# Patient Record
Sex: MALE | Race: WHITE | Employment: OTHER | URBAN - METROPOLITAN AREA
[De-identification: names, ages, dates, MRNs, and addresses within clinical notes are randomized per-mention and may not be internally consistent; named-entity substitution may affect disease eponyms.]

---

## 2019-08-13 ENCOUNTER — HOSPITAL ENCOUNTER (EMERGENCY)
Age: 69
Discharge: HOME | End: 2019-08-13
Payer: OTHER MISCELLANEOUS

## 2019-08-13 ENCOUNTER — APPOINTMENT (OUTPATIENT)
Dept: CT IMAGING | Age: 69
End: 2019-08-13
Payer: OTHER MISCELLANEOUS

## 2019-08-13 VITALS
HEART RATE: 59 BPM | OXYGEN SATURATION: 99 % | SYSTOLIC BLOOD PRESSURE: 133 MMHG | WEIGHT: 169.75 LBS | TEMPERATURE: 97.6 F | DIASTOLIC BLOOD PRESSURE: 76 MMHG | RESPIRATION RATE: 16 BRPM
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
## 2019-08-13 DIAGNOSIS — S16.1XXA ACUTE STRAIN OF NECK MUSCLE, INITIAL ENCOUNTER: ICD-10-CM

## 2019-08-13 PROCEDURE — 72125 CT NECK SPINE W/O DYE: CPT

## 2019-08-13 PROCEDURE — 99284 EMERGENCY DEPT VISIT MOD MDM: CPT

## 2019-08-13 RX ORDER — IBUPROFEN 800 MG/1
800 TABLET ORAL EVERY 8 HOURS PRN
Qty: 30 TABLET | Refills: 0 | Status: SHIPPED | OUTPATIENT
Start: 2019-08-13

## 2019-08-13 RX ORDER — CYCLOBENZAPRINE HCL 10 MG
10 TABLET ORAL 3 TIMES DAILY PRN
Qty: 21 TABLET | Refills: 0 | Status: SHIPPED | OUTPATIENT
Start: 2019-08-13 | End: 2019-08-20

## 2019-08-13 SDOH — HEALTH STABILITY: MENTAL HEALTH: HOW OFTEN DO YOU HAVE A DRINK CONTAINING ALCOHOL?: NEVER

## 2019-08-13 ASSESSMENT — PAIN SCALES - GENERAL: PAINLEVEL_OUTOF10: 5

## 2019-08-13 ASSESSMENT — PAIN DESCRIPTION - DESCRIPTORS: DESCRIPTORS: ACHING

## 2019-08-13 ASSESSMENT — PAIN DESCRIPTION - LOCATION: LOCATION: FINGER (COMMENT WHICH ONE)

## 2019-08-13 ASSESSMENT — PAIN DESCRIPTION - PAIN TYPE: TYPE: ACUTE PAIN

## 2019-08-14 NOTE — ED PROVIDER NOTES
strain: Not on file    Food insecurity:     Worry: Not on file     Inability: Not on file    Transportation needs:     Medical: Not on file     Non-medical: Not on file   Tobacco Use    Smoking status: Never Smoker    Smokeless tobacco: Never Used   Substance and Sexual Activity    Alcohol use: Never     Frequency: Never    Drug use: Never    Sexual activity: Not on file   Lifestyle    Physical activity:     Days per week: Not on file     Minutes per session: Not on file    Stress: Not on file   Relationships    Social connections:     Talks on phone: Not on file     Gets together: Not on file     Attends Zoroastrian service: Not on file     Active member of club or organization: Not on file     Attends meetings of clubs or organizations: Not on file     Relationship status: Not on file    Intimate partner violence:     Fear of current or ex partner: Not on file     Emotionally abused: Not on file     Physically abused: Not on file     Forced sexual activity: Not on file   Other Topics Concern    Not on file   Social History Narrative    Not on file     No family history on file.     PHYSICAL EXAM    VITAL SIGNS: /76   Pulse 59   Temp 97.6 °F (36.4 °C) (Temporal)   Resp 16   Wt 169 lb 12.1 oz (77 kg)   SpO2 99%    Constitutional:  Well developed, well nourished, no acute distress   HENT:  Atraumatic, moist mucus membranes  Neck: supple, no JVD, + mild midline cervical paraspinous tenderness to palpation  Respiratory: Lungs clear to auscultation bilaterally, no retractions   Cardiovascular:  Regular rate, no murmurs  GI:  Soft, nontender, no pulsatile masses   Musculoskeletal:  no edema, no acute deformities  Back: no thoracic paraspinous tenderness to palpation, no lumbar paraspinous tenderness to palpation, no bony midline tenderness  Integument:  Well hydrated, no petechiae   Neurologic: Alert & oriented, normal speech, motor 5/5 in upper extremities bilaterally, wrist/finger extension and

## 2019-08-28 ENCOUNTER — HOSPITAL ENCOUNTER (OUTPATIENT)
Dept: PHYSICAL THERAPY | Age: 69
Setting detail: THERAPIES SERIES
Discharge: HOME | End: 2019-08-28
Payer: MEDICARE

## 2019-08-28 PROCEDURE — 97161 PT EVAL LOW COMPLEX 20 MIN: CPT

## 2019-08-28 PROCEDURE — 97140 MANUAL THERAPY 1/> REGIONS: CPT

## 2019-08-28 PROCEDURE — 97530 THERAPEUTIC ACTIVITIES: CPT

## 2019-08-28 PROCEDURE — 97110 THERAPEUTIC EXERCISES: CPT

## 2019-08-29 NOTE — PROGRESS NOTES
Tolerance  Activity Tolerance: Patient Tolerated treatment well;Patient limited by pain         Plan   Plan  Times per week: 2  Times per day: Daily  Plan weeks: 6  Current Treatment Recommendations: Strengthening, ROM, Manual Therapy - Joint Manipulation, Pain Management, Manual Therapy - Soft Tissue Mobilization, Home Exercise Program, Modalities    G-Code       OutComes Score  Neck Disability Index Raw Score: 16 (08/29/19 3646)                                           Goals  Short term goals  Time Frame for Short term goals: 2 Weeks  Short term goal 1: Pt will show independence in HEP  Long term goals  Time Frame for Long term goals : 4 Weeks  Long term goal 1: Pt will show full cervical AROM that is pain free in all directions so he can sleep through the night  Long term goal 2: Pt will report <2/10 pain so he can return to all ADLs  Patient Goals   Patient goals : \"no pain\"       Therapy Time   Individual Concurrent Group Co-treatment   Time In           Time Out           Minutes                   Larissa Navarro PT

## 2019-09-03 ENCOUNTER — APPOINTMENT (OUTPATIENT)
Dept: PHYSICAL THERAPY | Age: 69
End: 2019-09-03
Payer: OTHER MISCELLANEOUS

## 2019-09-04 ENCOUNTER — APPOINTMENT (OUTPATIENT)
Dept: PHYSICAL THERAPY | Age: 69
End: 2019-09-04
Payer: OTHER MISCELLANEOUS

## 2019-09-09 ENCOUNTER — HOSPITAL ENCOUNTER (OUTPATIENT)
Dept: PHYSICAL THERAPY | Age: 69
Setting detail: THERAPIES SERIES
Discharge: HOME | End: 2019-09-09
Payer: OTHER MISCELLANEOUS

## 2019-09-09 PROCEDURE — 97140 MANUAL THERAPY 1/> REGIONS: CPT
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
## 2019-09-11 ENCOUNTER — HOSPITAL ENCOUNTER (OUTPATIENT)
Dept: PHYSICAL THERAPY | Age: 69
Setting detail: THERAPIES SERIES
Discharge: HOME | End: 2019-09-11
Payer: OTHER MISCELLANEOUS

## 2019-09-11 PROCEDURE — 97110 THERAPEUTIC EXERCISES: CPT

## 2019-09-11 PROCEDURE — 97140 MANUAL THERAPY 1/> REGIONS: CPT

## 2019-09-16 ENCOUNTER — HOSPITAL ENCOUNTER (OUTPATIENT)
Dept: PHYSICAL THERAPY | Age: 69
Setting detail: THERAPIES SERIES
Discharge: HOME | End: 2019-09-16
Payer: OTHER MISCELLANEOUS

## 2019-09-16 PROCEDURE — 97110 THERAPEUTIC EXERCISES: CPT

## 2019-09-16 PROCEDURE — 97140 MANUAL THERAPY 1/> REGIONS: CPT
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
## 2019-09-18 ENCOUNTER — HOSPITAL ENCOUNTER (OUTPATIENT)
Dept: PHYSICAL THERAPY | Age: 69
Setting detail: THERAPIES SERIES
Discharge: HOME | End: 2019-09-18
Payer: OTHER MISCELLANEOUS

## 2019-09-18 PROCEDURE — 97140 MANUAL THERAPY 1/> REGIONS: CPT

## 2019-09-23 ENCOUNTER — HOSPITAL ENCOUNTER (OUTPATIENT)
Dept: PHYSICAL THERAPY | Age: 69
Setting detail: THERAPIES SERIES
Discharge: HOME | End: 2019-09-23
Payer: OTHER MISCELLANEOUS

## 2019-09-23 PROCEDURE — 97110 THERAPEUTIC EXERCISES: CPT

## 2019-09-23 PROCEDURE — 97140 MANUAL THERAPY 1/> REGIONS: CPT

## 2019-09-30 ENCOUNTER — HOSPITAL ENCOUNTER (OUTPATIENT)
Dept: PHYSICAL THERAPY | Age: 69
Setting detail: THERAPIES SERIES
Discharge: HOME | End: 2019-09-30
Payer: OTHER MISCELLANEOUS

## 2019-09-30 PROCEDURE — 97110 THERAPEUTIC EXERCISES: CPT

## 2019-09-30 PROCEDURE — 97140 MANUAL THERAPY 1/> REGIONS: CPT

## 2019-10-02 ENCOUNTER — HOSPITAL ENCOUNTER (OUTPATIENT)
Dept: PHYSICAL THERAPY | Age: 69
Setting detail: THERAPIES SERIES
Discharge: HOME | End: 2019-10-02
Payer: OTHER MISCELLANEOUS

## 2019-10-02 PROCEDURE — 97140 MANUAL THERAPY 1/> REGIONS: CPT

## 2019-10-02 PROCEDURE — 97110 THERAPEUTIC EXERCISES: CPT

## 2019-10-09 ENCOUNTER — HOSPITAL ENCOUNTER (OUTPATIENT)
Dept: PHYSICAL THERAPY | Age: 69
Setting detail: THERAPIES SERIES
Discharge: HOME | End: 2019-10-09
Payer: OTHER MISCELLANEOUS

## 2019-10-09 PROCEDURE — 97140 MANUAL THERAPY 1/> REGIONS: CPT

## 2019-10-09 PROCEDURE — 97110 THERAPEUTIC EXERCISES: CPT

## 2019-10-11 ENCOUNTER — APPOINTMENT (OUTPATIENT)
Dept: PHYSICAL THERAPY | Age: 69
End: 2019-10-11
Payer: OTHER MISCELLANEOUS

## 2019-10-16 ENCOUNTER — HOSPITAL ENCOUNTER (OUTPATIENT)
Dept: PHYSICAL THERAPY | Age: 69
Setting detail: THERAPIES SERIES
Discharge: HOME | End: 2019-10-16
Payer: OTHER MISCELLANEOUS

## 2019-10-16 PROCEDURE — 97110 THERAPEUTIC EXERCISES: CPT

## 2019-10-16 PROCEDURE — 97140 MANUAL THERAPY 1/> REGIONS: CPT

## 2019-10-18 ENCOUNTER — APPOINTMENT (OUTPATIENT)
Dept: PHYSICAL THERAPY | Age: 69
End: 2019-10-18
Payer: OTHER MISCELLANEOUS

## 2019-10-22 ENCOUNTER — HOSPITAL ENCOUNTER (OUTPATIENT)
Dept: PHYSICAL THERAPY | Age: 69
Setting detail: THERAPIES SERIES
Discharge: HOME | End: 2019-10-22
Payer: OTHER MISCELLANEOUS

## 2019-10-22 PROCEDURE — 97110 THERAPEUTIC EXERCISES: CPT

## 2019-10-22 PROCEDURE — 97140 MANUAL THERAPY 1/> REGIONS: CPT

## 2019-10-24 ENCOUNTER — HOSPITAL ENCOUNTER (OUTPATIENT)
Dept: PHYSICAL THERAPY | Age: 69
Setting detail: THERAPIES SERIES
Discharge: HOME | End: 2019-10-24
Payer: OTHER MISCELLANEOUS

## 2019-10-24 PROCEDURE — 97110 THERAPEUTIC EXERCISES: CPT

## 2019-10-24 PROCEDURE — 97140 MANUAL THERAPY 1/> REGIONS: CPT

## 2019-10-28 ENCOUNTER — HOSPITAL ENCOUNTER (OUTPATIENT)
Dept: PHYSICAL THERAPY | Age: 69
Setting detail: THERAPIES SERIES
Discharge: HOME | End: 2019-10-28
Payer: OTHER MISCELLANEOUS

## 2019-10-28 PROCEDURE — 97110 THERAPEUTIC EXERCISES: CPT

## 2019-10-28 PROCEDURE — 97140 MANUAL THERAPY 1/> REGIONS: CPT

## 2019-10-30 ENCOUNTER — HOSPITAL ENCOUNTER (OUTPATIENT)
Dept: PHYSICAL THERAPY | Age: 69
Setting detail: THERAPIES SERIES
Discharge: HOME | End: 2019-10-30
Payer: OTHER MISCELLANEOUS

## 2019-10-30 PROCEDURE — 97140 MANUAL THERAPY 1/> REGIONS: CPT

## 2019-10-30 PROCEDURE — 97110 THERAPEUTIC EXERCISES: CPT

## 2019-11-04 ENCOUNTER — HOSPITAL ENCOUNTER (OUTPATIENT)
Dept: PHYSICAL THERAPY | Age: 69
Setting detail: THERAPIES SERIES
Discharge: HOME | End: 2019-11-04
Payer: COMMERCIAL

## 2019-11-04 PROCEDURE — 97110 THERAPEUTIC EXERCISES: CPT

## 2019-11-04 PROCEDURE — 97140 MANUAL THERAPY 1/> REGIONS: CPT

## 2019-11-06 ENCOUNTER — HOSPITAL ENCOUNTER (OUTPATIENT)
Dept: PHYSICAL THERAPY | Age: 69
Setting detail: THERAPIES SERIES
Discharge: HOME | End: 2019-11-06
Payer: COMMERCIAL

## 2019-11-06 PROCEDURE — 97140 MANUAL THERAPY 1/> REGIONS: CPT

## 2019-11-06 PROCEDURE — 97012 MECHANICAL TRACTION THERAPY: CPT

## 2019-11-06 PROCEDURE — 97110 THERAPEUTIC EXERCISES: CPT

## 2019-11-11 ENCOUNTER — HOSPITAL ENCOUNTER (OUTPATIENT)
Dept: PHYSICAL THERAPY | Age: 69
Setting detail: THERAPIES SERIES
Discharge: HOME | End: 2019-11-11
Payer: COMMERCIAL

## 2019-11-11 PROCEDURE — 97140 MANUAL THERAPY 1/> REGIONS: CPT

## 2019-11-13 ENCOUNTER — HOSPITAL ENCOUNTER (OUTPATIENT)
Dept: PHYSICAL THERAPY | Age: 69
Setting detail: THERAPIES SERIES
Discharge: HOME | End: 2019-11-13
Payer: COMMERCIAL

## 2019-11-13 PROCEDURE — 97012 MECHANICAL TRACTION THERAPY: CPT

## 2019-11-13 PROCEDURE — 97140 MANUAL THERAPY 1/> REGIONS: CPT

## 2019-11-18 ENCOUNTER — HOSPITAL ENCOUNTER (OUTPATIENT)
Dept: PHYSICAL THERAPY | Age: 69
Setting detail: THERAPIES SERIES
Discharge: HOME | End: 2019-11-18
Payer: COMMERCIAL

## 2019-11-18 PROCEDURE — 97012 MECHANICAL TRACTION THERAPY: CPT

## 2019-11-18 PROCEDURE — 97140 MANUAL THERAPY 1/> REGIONS: CPT

## 2019-11-18 PROCEDURE — 97110 THERAPEUTIC EXERCISES: CPT

## 2019-11-24 ENCOUNTER — APPOINTMENT (OUTPATIENT)
Dept: GENERAL RADIOLOGY | Age: 69
End: 2019-11-24
Payer: COMMERCIAL

## 2019-11-24 ENCOUNTER — HOSPITAL ENCOUNTER (EMERGENCY)
Age: 69
Discharge: HOME | End: 2019-11-24
Payer: COMMERCIAL

## 2019-11-24 VITALS
DIASTOLIC BLOOD PRESSURE: 95 MMHG | OXYGEN SATURATION: 97 % | WEIGHT: 176.37 LBS | TEMPERATURE: 98.3 F | SYSTOLIC BLOOD PRESSURE: 153 MMHG | HEIGHT: 68 IN | RESPIRATION RATE: 17 BRPM | HEART RATE: 58 BPM | BODY MASS INDEX: 26.73 KG/M2

## 2019-11-24 DIAGNOSIS — R68.84 MANDIBLE PAIN: ICD-10-CM
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
## 2019-11-24 DIAGNOSIS — S16.1XXA STRAIN OF NECK MUSCLE, INITIAL ENCOUNTER: ICD-10-CM

## 2019-11-24 PROCEDURE — 70100 X-RAY EXAM OF JAW <4VIEWS: CPT

## 2019-11-24 PROCEDURE — 99284 EMERGENCY DEPT VISIT MOD MDM: CPT

## 2019-11-24 ASSESSMENT — PAIN DESCRIPTION - ONSET: ONSET: ON-GOING

## 2019-11-24 ASSESSMENT — ENCOUNTER SYMPTOMS
CHEST TIGHTNESS: 0
NAUSEA: 0
VOMITING: 0
SHORTNESS OF BREATH: 0
COLOR CHANGE: 0

## 2019-11-24 ASSESSMENT — PAIN DESCRIPTION - PROGRESSION: CLINICAL_PROGRESSION: NOT CHANGED

## 2019-11-24 ASSESSMENT — PAIN DESCRIPTION - DESCRIPTORS: DESCRIPTORS: DISCOMFORT

## 2019-11-24 ASSESSMENT — PAIN DESCRIPTION - FREQUENCY: FREQUENCY: CONTINUOUS

## 2019-11-24 ASSESSMENT — PAIN DESCRIPTION - ORIENTATION: ORIENTATION: LEFT

## 2019-11-24 ASSESSMENT — PAIN DESCRIPTION - LOCATION: LOCATION: NECK;BACK

## 2019-11-24 ASSESSMENT — PAIN DESCRIPTION - PAIN TYPE: TYPE: ACUTE PAIN

## 2019-11-24 ASSESSMENT — PAIN - FUNCTIONAL ASSESSMENT: PAIN_FUNCTIONAL_ASSESSMENT: ACTIVITIES ARE NOT PREVENTED

## 2019-11-24 ASSESSMENT — PAIN SCALES - GENERAL: PAINLEVEL_OUTOF10: 5

## 2019-12-16 ENCOUNTER — HOSPITAL ENCOUNTER (OUTPATIENT)
Dept: PHYSICAL THERAPY | Age: 69
Setting detail: THERAPIES SERIES
Discharge: HOME | End: 2019-12-16
Payer: COMMERCIAL

## 2019-12-16 PROCEDURE — 97140 MANUAL THERAPY 1/> REGIONS: CPT

## 2019-12-16 PROCEDURE — 97530 THERAPEUTIC ACTIVITIES: CPT

## 2019-12-16 PROCEDURE — 97110 THERAPEUTIC EXERCISES: CPT

## 2019-12-16 PROCEDURE — 97162 PT EVAL MOD COMPLEX 30 MIN: CPT

## 2019-12-20 ENCOUNTER — HOSPITAL ENCOUNTER (OUTPATIENT)
Dept: PHYSICAL THERAPY | Age: 69
Setting detail: THERAPIES SERIES
Discharge: HOME | End: 2019-12-20
Payer: COMMERCIAL

## 2019-12-20 PROCEDURE — 97140 MANUAL THERAPY 1/> REGIONS: CPT

## 2019-12-23 ENCOUNTER — HOSPITAL ENCOUNTER (OUTPATIENT)
Dept: PHYSICAL THERAPY | Age: 69
Setting detail: THERAPIES SERIES
Discharge: HOME | End: 2019-12-23
Payer: COMMERCIAL

## 2019-12-23 PROCEDURE — 97110 THERAPEUTIC EXERCISES: CPT

## 2019-12-23 PROCEDURE — 97140 MANUAL THERAPY 1/> REGIONS: CPT

## 2019-12-26 ENCOUNTER — HOSPITAL ENCOUNTER (OUTPATIENT)
Dept: PHYSICAL THERAPY | Age: 69
Setting detail: THERAPIES SERIES
Discharge: HOME | End: 2019-12-26
Payer: COMMERCIAL

## 2019-12-26 PROCEDURE — 97110 THERAPEUTIC EXERCISES: CPT

## 2019-12-26 PROCEDURE — 97140 MANUAL THERAPY 1/> REGIONS: CPT

## 2019-12-30 ENCOUNTER — HOSPITAL ENCOUNTER (OUTPATIENT)
Dept: PHYSICAL THERAPY | Age: 69
Setting detail: THERAPIES SERIES
Discharge: HOME | End: 2019-12-30
Payer: COMMERCIAL

## 2019-12-30 PROCEDURE — 97140 MANUAL THERAPY 1/> REGIONS: CPT

## 2019-12-30 PROCEDURE — 97110 THERAPEUTIC EXERCISES: CPT

## 2020-01-02 ENCOUNTER — HOSPITAL ENCOUNTER (OUTPATIENT)
Dept: PHYSICAL THERAPY | Age: 70
Setting detail: THERAPIES SERIES
Discharge: HOME | End: 2020-01-02
Payer: COMMERCIAL

## 2020-01-02 PROCEDURE — 97140 MANUAL THERAPY 1/> REGIONS: CPT

## 2020-01-02 PROCEDURE — 97012 MECHANICAL TRACTION THERAPY: CPT
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
## 2020-01-06 ENCOUNTER — HOSPITAL ENCOUNTER (OUTPATIENT)
Dept: PHYSICAL THERAPY | Age: 70
Setting detail: THERAPIES SERIES
Discharge: HOME | End: 2020-01-06
Payer: COMMERCIAL

## 2020-01-06 PROCEDURE — 97012 MECHANICAL TRACTION THERAPY: CPT

## 2020-01-06 PROCEDURE — 97140 MANUAL THERAPY 1/> REGIONS: CPT

## 2020-01-06 PROCEDURE — 97110 THERAPEUTIC EXERCISES: CPT
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
## 2020-01-08 ENCOUNTER — HOSPITAL ENCOUNTER (OUTPATIENT)
Dept: PHYSICAL THERAPY | Age: 70
Setting detail: THERAPIES SERIES
Discharge: HOME | End: 2020-01-08
Payer: COMMERCIAL

## 2020-01-08 PROCEDURE — 97110 THERAPEUTIC EXERCISES: CPT

## 2020-01-08 PROCEDURE — 97140 MANUAL THERAPY 1/> REGIONS: CPT

## 2020-01-08 PROCEDURE — 97012 MECHANICAL TRACTION THERAPY: CPT
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
## 2020-01-29 ENCOUNTER — HOSPITAL ENCOUNTER (OUTPATIENT)
Dept: PHYSICAL THERAPY | Age: 70
Setting detail: THERAPIES SERIES
Discharge: HOME | End: 2020-01-29
Payer: COMMERCIAL

## 2020-01-29 PROCEDURE — 97140 MANUAL THERAPY 1/> REGIONS: CPT

## 2020-01-29 PROCEDURE — 97012 MECHANICAL TRACTION THERAPY: CPT
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
## 2020-02-07 ENCOUNTER — HOSPITAL ENCOUNTER (OUTPATIENT)
Dept: PHYSICAL THERAPY | Age: 70
Setting detail: THERAPIES SERIES
Discharge: HOME | End: 2020-02-07
Payer: COMMERCIAL

## 2020-02-07 PROCEDURE — 97110 THERAPEUTIC EXERCISES: CPT

## 2020-02-07 PROCEDURE — 97140 MANUAL THERAPY 1/> REGIONS: CPT

## 2020-02-07 PROCEDURE — 97012 MECHANICAL TRACTION THERAPY: CPT
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
## 2020-02-10 ENCOUNTER — HOSPITAL ENCOUNTER (OUTPATIENT)
Dept: PHYSICAL THERAPY | Age: 70
Setting detail: THERAPIES SERIES
Discharge: HOME | End: 2020-02-10
Payer: COMMERCIAL

## 2020-02-10 NOTE — PROGRESS NOTES
Physical Therapy  Cancellation/No-show Note  Patient Name:  Mahnaz Galeas  :  1950   Date:  2/10/2020  Cancelled visits to date: 1  No-shows to date: 0    For today's appointment patient:  [x]  Cancelled  []  Rescheduled appointment  []  No-show     Reason given by patient:  [x]  Patient ill  []  Conflicting appointment  []  No transportation    []  Conflict with work  []  No reason given  []  Other:     Comments:  Has flu    Electronically signed by:  Laurie Duane, PT

## 2020-02-12 ENCOUNTER — HOSPITAL ENCOUNTER (OUTPATIENT)
Dept: PHYSICAL THERAPY | Age: 70
Setting detail: THERAPIES SERIES
Discharge: HOME | End: 2020-02-12
Payer: COMMERCIAL

## 2020-02-12 PROCEDURE — 97110 THERAPEUTIC EXERCISES: CPT

## 2020-02-12 PROCEDURE — 97012 MECHANICAL TRACTION THERAPY: CPT

## 2020-02-12 PROCEDURE — 97140 MANUAL THERAPY 1/> REGIONS: CPT

## 2020-02-12 NOTE — FLOWSHEET NOTE
across low back when getting up from supine position. 1/6/19: Pt states he is still sore in the back and neck. 1/29/20: States he has been having low back pain up to a 5/10 when standing for a period of time while working. Also gets a little pain in his left hamstring after sitting for 15-20 min. Still having some pain in his neck on the left side, mostly at night. 2/7/20: Pt states back is still hurting some in the middle low back, starts after about 1.5 hr.  02/12/20: Patient reports some stiffness on left side of the neck. 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 Objective:   Observation:    Test measurements:      Exercises:  Exercise/Equipment Resistance/Repetitions Other comments   HSS    Chin tuck    PPT  10 x 10\"   Tband High Row  2 x 20 Blue  LPD  2 x 20 Dark Blue    Piriformis Stretch 3 x 30\" B         HEP     SKTC, Piriformis, and HS Stretch  12/16   PPT  12/16    UT stretch  12/16   Cervical AROM  12/16   Supine cerv rotation  12/20   shrugs  12/20   Supine chin tuck  12/20                    Other Therapeutic Activities:      Home Exercise Program:        Manual Treatments:   Cervical:  STM to cervical paraspinals on left and left UT  UT stretch, cervical sideglides, and Light traction to cervical    Lumbar:   STM to lumbar paraspinals, PA mobilizations to lumbar spine - prone  B hip distraction mobilization    Modalities:    Cervical Traction: 19#/10# x 13 min        Progression Towards Functional goals:  [x] Patient is progressing as expected towards functional goals listed. [] Progression is slowed due to complexities listed. [] Progression has been slowed due to co-morbidities. [] Plan just implemented, too soon to assess goals progression  [] Other:    Charges:     Therapeutic Exercise:  [x] (22431) Provided verbal/tactile cueing for activities to restore or maintain strength, flexibility, endurance, ROM for improvements with self-care, mobility, lifting and ambulation. Neuromuscular Re-Education  [] (06978) Provided verbal/tactile cueing for activities to restore or maintain balance, coordination, kinesthetic sense, posture, motor skill, proprioception for self-care, mobility, lifting, and ambulation. Therapeutic Activities:    [x] (55474) Provided verbal/tactile cueing to address functional limitations related to loss of mobility, strength, balance, and coordination. Gait Training:  [] (36190) Provided training and instruction to the patient for proper postural muscle recruitment and positioning with ambulation re-education     Home Exercise Program:    [x] (70698) Reviewed/Progressed HEP activities related to strengthening, flexibility, endurance, ROM for functional self-care, mobility, lifting and ambulation   [] (32123) Reviewed/Progressed HEP activities related to improving balance, coordination, kinesthetic sense, posture, motor skill, proprioception for self-care, mobility, lifting, and ambulation      Manual Treatments:  MFR / STM / Oscillations-Mobs:  G-I, II, III, IV / Manipulation / MLD  [x] (56678) Provided manual therapy to mobilize  soft tissue/joints/fluid for the purpose of modulating pain, promoting relaxation, increasing ROM, reducing/eliminating soft tissue swelling/inflammation/restriction, improving soft tissue extensibility and allowing for proper ROM for normal function with self- care, mobility, lifting and ambulation.         Timed Code Treatment Minutes: 50   Total Treatment Minutes: 65     [] EVAL (LOW) 99153   [] EVAL (MOD) 97536   [] EVAL (HIGH) 20846   [] RE-EVAL   [x] TE (37833) x 1    [] Aquatic (20300) x  [] NMR (38984)   x  [] Aquatic Group (02764) x  [x] Manual (14374) x 2  [] Ultrasound (28658) x  [] TA (27228) x  [x] Mech Traction (86576)  [] Ionto (19050)           [] ES (un) (86812):   [] Vasopump (03406) [] Other:      Assessment  [x] Patient tolerated treatment well [] Patient limited by

## 2020-02-14 ENCOUNTER — HOSPITAL ENCOUNTER (OUTPATIENT)
Dept: PHYSICAL THERAPY | Age: 70
Setting detail: THERAPIES SERIES
Discharge: HOME | End: 2020-02-14
Payer: COMMERCIAL

## 2020-02-14 PROCEDURE — 97530 THERAPEUTIC ACTIVITIES: CPT

## 2020-02-14 PROCEDURE — 97140 MANUAL THERAPY 1/> REGIONS: CPT

## 2020-02-14 PROCEDURE — 97012 MECHANICAL TRACTION THERAPY: CPT

## 2020-02-17 ENCOUNTER — HOSPITAL ENCOUNTER (OUTPATIENT)
Dept: PHYSICAL THERAPY | Age: 70
Setting detail: THERAPIES SERIES
Discharge: HOME | End: 2020-02-17
Payer: COMMERCIAL

## 2020-02-17 PROCEDURE — 97012 MECHANICAL TRACTION THERAPY: CPT

## 2020-02-17 PROCEDURE — 97140 MANUAL THERAPY 1/> REGIONS: CPT

## 2020-02-17 NOTE — FLOWSHEET NOTE
Gait Training:  [] (00756) Provided training and instruction to the patient for proper postural muscle recruitment and positioning with ambulation re-education     Home Exercise Program:    [x] (81335) Reviewed/Progressed HEP activities related to strengthening, flexibility, endurance, ROM for functional self-care, mobility, lifting and ambulation   [] (98853) Reviewed/Progressed HEP activities related to improving balance, coordination, kinesthetic sense, posture, motor skill, proprioception for self-care, mobility, lifting, and ambulation      Manual Treatments:  MFR / STM / Oscillations-Mobs:  G-I, II, III, IV / Manipulation / MLD  [x] (15640) Provided manual therapy to mobilize  soft tissue/joints/fluid for the purpose of modulating pain, promoting relaxation, increasing ROM, reducing/eliminating soft tissue swelling/inflammation/restriction, improving soft tissue extensibility and allowing for proper ROM for normal function with self- care, mobility, lifting and ambulation.         Timed Code Treatment Minutes: 30   Total Treatment Minutes: 53     [] EVAL (LOW) 90849   [] EVAL (MOD) 16009   [] EVAL (HIGH) 13268   [] RE-EVAL   [] TE (18843) x     [] Aquatic (17631) x  [] NMR (75799)   x  [] Aquatic Group (41786) x  [x] Manual (00809) x 2   [] Ultrasound (09777) x  [] TA (67246) x  [x] Mech Traction (46709)  [] Ionto (00292)           [] ES (un) (60918):   [] Vasopump (03208) [] Other:      Assessment  [x] Patient tolerated treatment well [] Patient limited by fatigue  [x] Patient limited by pain  [] Patient limited by other medical complications  [] Other:     Prognosis: [x] Good [] Fair  [] Poor    Goals:    Short term goals  Time Frame for Short term goals: 3 Weeks  Short term goal 1: Pt will show independence in HEP for lumbar and cervical  Short term goal 2: Pt will show full lumbar AROM  Short term goal 3: Pt will report no radicular symptoms in left upper arm      Long term goals  Time Frame for Long term goals : 6 Weeks  Long term goal 1: Pt will show full cervical and lumbar AROM without pain so he can sleep through the night and return to all ADLs. Long term goal 2: Pt will report <2/10 pain consistently so he can lift light weight  Long term goal 3: Pt will show normal left hamstring muscle length     Patient Requires Follow-up: [x] Yes  [] No    Plan:   [x] Continue per plan of care [] Alter current plan (see comments)  [] Plan of care initiated [] Hold pending MD visit [] Discharge    Plan for Next Session:    Manual above to cervical, thoracic, and lumbar to restore mobility.   Progress to mechanical cervical traction  Core and scapular strengthening, posture education  Modalities: MHP    Electronically signed by:  Ubaldo Castanon PT

## 2020-02-19 ENCOUNTER — HOSPITAL ENCOUNTER (OUTPATIENT)
Dept: PHYSICAL THERAPY | Age: 70
Setting detail: THERAPIES SERIES
Discharge: HOME | End: 2020-02-19
Payer: COMMERCIAL

## 2020-02-19 PROCEDURE — 97012 MECHANICAL TRACTION THERAPY: CPT

## 2020-02-19 PROCEDURE — 97110 THERAPEUTIC EXERCISES: CPT

## 2020-02-19 PROCEDURE — 97140 MANUAL THERAPY 1/> REGIONS: CPT
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
## 2020-02-24 ENCOUNTER — HOSPITAL ENCOUNTER (OUTPATIENT)
Dept: PHYSICAL THERAPY | Age: 70
Setting detail: THERAPIES SERIES
Discharge: HOME | End: 2020-02-24
Payer: COMMERCIAL

## 2020-02-24 PROCEDURE — 97110 THERAPEUTIC EXERCISES: CPT

## 2020-02-24 PROCEDURE — 97140 MANUAL THERAPY 1/> REGIONS: CPT

## 2020-02-24 PROCEDURE — 97012 MECHANICAL TRACTION THERAPY: CPT

## 2020-02-24 NOTE — FLOWSHEET NOTE
neck is feeling better. Still some pain in left low back after standing for a few hours but is improving  2/24/20: Pt states he is doing better but still having back pain after standing at work for 2 hours and neck pain when turning his head fully to the right. Objective:   Observation:    Test measurements:      Exercises:  Exercise/Equipment Resistance/Repetitions Other comments   HSS 3 x 30\" B   Chin tuck 10 x 10\"   PPT  10 x 10\"   Tband High Row  2 x 20 Blue  LPD  2 x 20 Dark Blue   Lateral cerv. 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 Pt demonstrated and verbalized understanding. Home Exercise Program:        Manual Treatments:   Cervical:  STM to cervical paraspinals on left and left UT  UT stretch, cervical sideglides, and Light traction to cervical  Lumbar:   STM to lumbar paraspinals, PA mobilizations to lumbar spine - prone  B hip distraction mobilization    Modalities:    Cervical Traction: 19#/10# x 13 min        Progression Towards Functional goals:  [x] Patient is progressing as expected towards functional goals listed. [] Progression is slowed due to complexities listed. [] Progression has been slowed due to co-morbidities. [] Plan just implemented, too soon to assess goals progression  [] Other:    Charges: Therapeutic Exercise:  [x] (21035) Provided verbal/tactile cueing for activities to restore or maintain strength, flexibility, endurance, ROM for improvements with self-care, mobility, lifting and ambulation.     Neuromuscular Re-Education  [] (86938) Provided verbal/tactile cueing for Good [] Fair  [] Poor    Goals:    Short term goals  Time Frame for Short term goals: 3 Weeks  Short term goal 1: Pt will show independence in HEP for lumbar and cervical  Short term goal 2: Pt will show full lumbar AROM  Short term goal 3: Pt will report no radicular symptoms in left upper arm      Long term goals  Time Frame for Long term goals : 6 Weeks  Long term goal 1: Pt will show full cervical and lumbar AROM without pain so he can sleep through the night and return to all ADLs. Long term goal 2: Pt will report <2/10 pain consistently so he can lift light weight  Long term goal 3: Pt will show normal left hamstring muscle length     Patient Requires Follow-up: [x] Yes  [] No    Plan:   [x] Continue per plan of care [] Alter current plan (see comments)  [] Plan of care initiated [] Hold pending MD visit [] Discharge    Plan for Next Session:    Manual above to cervical, thoracic, and lumbar to restore mobility.   Progress to mechanical cervical traction  Core and scapular strengthening, posture education  Modalities: MHP    Electronically signed by:  Idris Jason PT

## 2020-02-26 ENCOUNTER — HOSPITAL ENCOUNTER (OUTPATIENT)
Dept: PHYSICAL THERAPY | Age: 70
Setting detail: THERAPIES SERIES
Discharge: HOME | End: 2020-02-26
Payer: COMMERCIAL

## 2020-02-26 PROCEDURE — 97140 MANUAL THERAPY 1/> REGIONS: CPT

## 2020-02-26 PROCEDURE — 97012 MECHANICAL TRACTION THERAPY: CPT
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
## 2020-03-02 ENCOUNTER — HOSPITAL ENCOUNTER (OUTPATIENT)
Dept: PHYSICAL THERAPY | Age: 70
Setting detail: THERAPIES SERIES
Discharge: HOME | End: 2020-03-02
Payer: COMMERCIAL

## 2020-03-02 PROCEDURE — 97140 MANUAL THERAPY 1/> REGIONS: CPT

## 2020-03-02 PROCEDURE — 97012 MECHANICAL TRACTION THERAPY: CPT

## 2020-03-02 NOTE — FLOWSHEET NOTE
flexibility, endurance, ROM for improvements with self-care, mobility, lifting and ambulation. Neuromuscular Re-Education  [] (80276) Provided verbal/tactile cueing for activities to restore or maintain balance, coordination, kinesthetic sense, posture, motor skill, proprioception for self-care, mobility, lifting, and ambulation. Therapeutic Activities:    [x] (69198) Provided verbal/tactile cueing to address functional limitations related to loss of mobility, strength, balance, and coordination. Gait Training:  [] (70194) Provided training and instruction to the patient for proper postural muscle recruitment and positioning with ambulation re-education     Home Exercise Program:    [x] (39303) Reviewed/Progressed HEP activities related to strengthening, flexibility, endurance, ROM for functional self-care, mobility, lifting and ambulation   [] (89028) Reviewed/Progressed HEP activities related to improving balance, coordination, kinesthetic sense, posture, motor skill, proprioception for self-care, mobility, lifting, and ambulation      Manual Treatments:  MFR / STM / Oscillations-Mobs:  G-I, II, III, IV / Manipulation / MLD  [x] (39985) Provided manual therapy to mobilize  soft tissue/joints/fluid for the purpose of modulating pain, promoting relaxation, increasing ROM, reducing/eliminating soft tissue swelling/inflammation/restriction, improving soft tissue extensibility and allowing for proper ROM for normal function with self- care, mobility, lifting and ambulation.         Timed Code Treatment Minutes: 40   Total Treatment Minutes: 62     [] EVAL (LOW) 75137   [] EVAL (MOD) 47563   [] EVAL (HIGH) 60388   [] RE-EVAL   [] TE (78674) x     [] Aquatic (75070) x  [] NMR (88315)   x  [] Aquatic Group (57989) x  [x] Manual (14664) x 3  [] Ultrasound (19998) x  [] TA (01595) x  [x] Mech Traction (48556)  [] Ionto (78272)           [] ES (un) (78757):   [] Vasopump (64728) [] Other:      Assessment  [x] Patient tolerated treatment well [] Patient limited by fatigue  [] Patient limited by pain  [] Patient limited by other medical complications  [] Other:     Prognosis: [x] Good [] Fair  [] Poor    Goals:    Short term goals  Time Frame for Short term goals: 3 Weeks  Short term goal 1: Pt will show independence in HEP for lumbar and cervical  Short term goal 2: Pt will show full lumbar AROM  Short term goal 3: Pt will report no radicular symptoms in left upper arm      Long term goals  Time Frame for Long term goals : 6 Weeks  Long term goal 1: Pt will show full cervical and lumbar AROM without pain so he can sleep through the night and return to all ADLs. Long term goal 2: Pt will report <2/10 pain consistently so he can lift light weight  Long term goal 3: Pt will show normal left hamstring muscle length     Patient Requires Follow-up: [x] Yes  [] No    Plan:   [x] Continue per plan of care [] Alter current plan (see comments)  [] Plan of care initiated [] Hold pending MD visit [] Discharge    Plan for Next Session:    Manual above to cervical, thoracic, and lumbar to restore mobility.   Progress to mechanical cervical traction  Core and scapular strengthening, posture education  Modalities: MHP    Electronically signed by:  Martinez Goyal PT

## 2020-03-04 ENCOUNTER — HOSPITAL ENCOUNTER (OUTPATIENT)
Dept: PHYSICAL THERAPY | Age: 70
Setting detail: THERAPIES SERIES
Discharge: HOME | End: 2020-03-04
Payer: COMMERCIAL

## 2020-03-04 PROCEDURE — 97012 MECHANICAL TRACTION THERAPY: CPT

## 2020-03-04 PROCEDURE — 97530 THERAPEUTIC ACTIVITIES: CPT

## 2020-03-04 PROCEDURE — 97140 MANUAL THERAPY 1/> REGIONS: CPT

## 2020-03-04 NOTE — FLOWSHEET NOTE
States neck is feeling better. Still some pain in left low back after standing for a few hours but is improving  2/24/20: Pt states he is doing better but still having back pain after standing at work for 2 hours and neck pain when turning his head fully to the right.  2/26/20: Feeling a little better, slow progress  3/2/20: States he was out doing some yard work yesterday and was really sore in the evening in his neck and back. Feels better today  3/4/20: Neck is feeling pretty good. Back still hurts after a few hours of standing for work. Objective:   Observation:    Test measurements:      Exercises:  Exercise/Equipment Resistance/Repetitions Other comments   HSS 3 x 30\" B   Chin tuck    PPT     Tband    Lateral cerv. 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 Pt demonstrated and verbalized understanding. Home Exercise Program:        Manual Treatments:   Cervical:    Lumbar:   STM to lumbar paraspinals, PA mobilizations to lumbar spine - prone. Focused on L lower thoracic/upper lumbar area, pt had some soft tissue restrictions that were felt      Modalities:    Cervical Traction: 19#/10# x 13 min        Progression Towards Functional goals:  [x] Patient is progressing as expected towards functional goals listed. [] Progression is slowed due to complexities listed. [] Progression has been slowed due to co-morbidities. [] Plan just implemented, too soon to assess goals progression  [] Other:    Charges:     Therapeutic Exercise:  [x] (23581) Provided verbal/tactile cueing for activities to restore or maintain
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
## 2020-03-09 ENCOUNTER — HOSPITAL ENCOUNTER (OUTPATIENT)
Dept: PHYSICAL THERAPY | Age: 70
Setting detail: THERAPIES SERIES
Discharge: HOME | End: 2020-03-09
Payer: COMMERCIAL

## 2020-03-09 PROCEDURE — 97140 MANUAL THERAPY 1/> REGIONS: CPT

## 2020-03-09 PROCEDURE — 97012 MECHANICAL TRACTION THERAPY: CPT
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
## 2020-03-11 ENCOUNTER — HOSPITAL ENCOUNTER (OUTPATIENT)
Age: 70
Discharge: HOME | End: 2020-03-11
Payer: MEDICARE

## 2020-03-11 ENCOUNTER — HOSPITAL ENCOUNTER (OUTPATIENT)
Dept: PHYSICAL THERAPY | Age: 70
Setting detail: THERAPIES SERIES
Discharge: HOME | End: 2020-03-11
Payer: COMMERCIAL

## 2020-03-11 ENCOUNTER — HOSPITAL ENCOUNTER (OUTPATIENT)
Dept: GENERAL RADIOLOGY | Age: 70
Discharge: HOME | End: 2020-03-11
Payer: COMMERCIAL

## 2020-03-11 PROCEDURE — 97140 MANUAL THERAPY 1/> REGIONS: CPT

## 2020-03-11 PROCEDURE — 72100 X-RAY EXAM L-S SPINE 2/3 VWS: CPT

## 2020-03-11 PROCEDURE — 97012 MECHANICAL TRACTION THERAPY: CPT
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
## 2020-03-16 ENCOUNTER — HOSPITAL ENCOUNTER (OUTPATIENT)
Dept: PHYSICAL THERAPY | Age: 70
Setting detail: THERAPIES SERIES
Discharge: HOME | End: 2020-03-16
Payer: COMMERCIAL

## 2020-03-16 PROCEDURE — 97140 MANUAL THERAPY 1/> REGIONS: CPT

## 2020-03-16 PROCEDURE — 97110 THERAPEUTIC EXERCISES: CPT

## 2020-03-16 PROCEDURE — 97012 MECHANICAL TRACTION THERAPY: CPT

## 2020-03-18 ENCOUNTER — HOSPITAL ENCOUNTER (OUTPATIENT)
Dept: PHYSICAL THERAPY | Age: 70
Setting detail: THERAPIES SERIES
Discharge: HOME | End: 2020-03-18
Payer: COMMERCIAL

## 2020-03-18 PROCEDURE — 97140 MANUAL THERAPY 1/> REGIONS: CPT

## 2020-03-18 PROCEDURE — 97110 THERAPEUTIC EXERCISES: CPT

## 2020-03-18 PROCEDURE — 97012 MECHANICAL TRACTION THERAPY: CPT

## 2020-03-18 NOTE — FLOWSHEET NOTE
(31567) Provided verbal/tactile cueing for activities to restore or maintain strength, flexibility, endurance, ROM for improvements with self-care, mobility, lifting and ambulation. Neuromuscular Re-Education  [] (90502) Provided verbal/tactile cueing for activities to restore or maintain balance, coordination, kinesthetic sense, posture, motor skill, proprioception for self-care, mobility, lifting, and ambulation. Therapeutic Activities:    [x] (95118) Provided verbal/tactile cueing to address functional limitations related to loss of mobility, strength, balance, and coordination. Gait Training:  [] (03919) Provided training and instruction to the patient for proper postural muscle recruitment and positioning with ambulation re-education     Home Exercise Program:    [x] (27942) Reviewed/Progressed HEP activities related to strengthening, flexibility, endurance, ROM for functional self-care, mobility, lifting and ambulation   [] (69389) Reviewed/Progressed HEP activities related to improving balance, coordination, kinesthetic sense, posture, motor skill, proprioception for self-care, mobility, lifting, and ambulation      Manual Treatments:  MFR / STM / Oscillations-Mobs:  G-I, II, III, IV / Manipulation / MLD  [x] (75311) Provided manual therapy to mobilize  soft tissue/joints/fluid for the purpose of modulating pain, promoting relaxation, increasing ROM, reducing/eliminating soft tissue swelling/inflammation/restriction, improving soft tissue extensibility and allowing for proper ROM for normal function with self- care, mobility, lifting and ambulation.         Timed Code Treatment Minutes: 62   Total Treatment Minutes: 80     [] EVAL (LOW) 72158   [] EVAL (MOD) 02846   [] EVAL (HIGH) 61308    [] RE-EVAL   [x] TE (89224) x 1     [] Aquatic (75239) x  [] NMR (10374)   x  [] Aquatic Group (81666) x  [x] Manual (73425) x 3  [] Ultrasound (98155) x  [] TA (72557) x   [x] Mech Traction (98915)  [] Ionto

## 2020-03-23 ENCOUNTER — APPOINTMENT (OUTPATIENT)
Dept: PHYSICAL THERAPY | Age: 70
End: 2020-03-23
Payer: COMMERCIAL

## 2020-03-23 NOTE — PROGRESS NOTES
listed. [] Progression has been slowed due to co-morbidities. [] Plan just implemented, too soon to assess goals progression  [] Other:    Goals:  Short term goals  Time Frame for Short term goals: 3 Weeks  Short term goal 1: Pt will show independence in HEP for lumbar and cervical- MET  Short term goal 2: Pt will show full lumbar AROM- MET  Short term goal 3: Pt will report no radicular symptoms in left upper arm - MET     Long term goals  Time Frame for Long term goals : 6 Weeks  Long term goal 1: Pt will show full cervical and lumbar AROM without pain so he can sleep through the night and return to all ADLs. - MET  Long term goal 2: Pt will report <2/10 pain consistently so he can lift light weight- MOSTLY MET  Long term goal 3: Pt will show normal left hamstring muscle length - MET     Rehab Potential: [x] Excellent [x] Good [] Fair  [] Poor     Goal Status:  [] Achieved [x] Partially Achieved  [] Not Achieved     Current Frequency/Duration:  # Days per week: [] 1 day # Weeks: [] 1 week [] 4 weeks      [x] 2 days   [] 2 weeks [] 5 weeks      [] 3 days   [] 3 weeks [x] 6 weeks     Patient Status: [] Continue per initial plan of Care     [x] Patient now discharged     [] Additional visits requested, Please re-certify for additional visits:      Requested frequency/duration:  X/week for weeks    Electronically signed by:  Rich Pacheco PT    If you have any questions or concerns, please don't hesitate to call.   Thank you for your referral.    Physician Signature:________________________________Date:__________________  By signing above, therapists plan is approved by physician

## 2020-03-25 ENCOUNTER — APPOINTMENT (OUTPATIENT)
Dept: PHYSICAL THERAPY | Age: 70
End: 2020-03-25
Payer: COMMERCIAL